# Patient Record
Sex: MALE | Race: WHITE
[De-identification: names, ages, dates, MRNs, and addresses within clinical notes are randomized per-mention and may not be internally consistent; named-entity substitution may affect disease eponyms.]

---

## 2021-07-04 ENCOUNTER — HOSPITAL ENCOUNTER (EMERGENCY)
Dept: HOSPITAL 33 - ED | Age: 71
Discharge: HOME | End: 2021-07-04
Payer: MEDICARE

## 2021-07-04 VITALS — DIASTOLIC BLOOD PRESSURE: 76 MMHG | SYSTOLIC BLOOD PRESSURE: 156 MMHG | HEART RATE: 61 BPM

## 2021-07-04 VITALS — OXYGEN SATURATION: 97 %

## 2021-07-04 DIAGNOSIS — M54.2: ICD-10-CM

## 2021-07-04 DIAGNOSIS — E03.9: ICD-10-CM

## 2021-07-04 DIAGNOSIS — S01.81XA: Primary | ICD-10-CM

## 2021-07-04 DIAGNOSIS — H57.12: ICD-10-CM

## 2021-07-04 DIAGNOSIS — R11.0: ICD-10-CM
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
## 2021-07-04 DIAGNOSIS — E78.00: ICD-10-CM

## 2021-07-04 DIAGNOSIS — S01.112A: ICD-10-CM

## 2021-07-04 DIAGNOSIS — I10: ICD-10-CM

## 2021-07-04 DIAGNOSIS — R51.9: ICD-10-CM

## 2021-07-04 PROCEDURE — 70486 CT MAXILLOFACIAL W/O DYE: CPT

## 2021-07-04 PROCEDURE — 70450 CT HEAD/BRAIN W/O DYE: CPT

## 2021-07-04 PROCEDURE — 72125 CT NECK SPINE W/O DYE: CPT

## 2021-07-04 PROCEDURE — 99283 EMERGENCY DEPT VISIT LOW MDM: CPT

## 2021-07-04 PROCEDURE — 12011 RPR F/E/E/N/L/M 2.5 CM/<: CPT

## 2021-07-04 NOTE — XRAY
Indication: Status post fall.  Left facial laceration.



Multiple contiguous axial images obtained through the facial bones.  Sagittal

and coronal reformatted images obtained.



Comparison: None



Mild left periorbital soft tissue swelling/laceration with mild subcutaneous

emphysema.  No acute fracture, suspicious bony lesions, or radiopaque foreign

body.  Orbits including roof, walls, and floors are intact.  Paranasal sinuses

and nasal passages are clear.  Mild nasal septal deviation to the right.

Remaining visualized noncontrasted soft tissues are unremarkable.



CT head and CT cervical spine reported separately.



Impression: Left periorbital soft tissue swelling/laceration.  Negative acute

fracture or radiopaque foreign body.  Incidental mild septal deviation.



Comment: Preliminary interpretation was made by [facility].  No critical discrepancy.

## 2021-07-04 NOTE — XRAY
Indication: Status post fall.  Left facial laceration.



Multiple contiguous axial images obtained through the head without contrast.



Comparison: None



Age-appropriate global atrophy and mild periventricular degenerative

micro-ischemia bilaterally.  No acute intracranial hemorrhage, abnormal

extra-axial fluid collection, or mass effect.  Fourth ventricle is midline

without hydrocephalus.  Bony calvarium intact.  Visualized paranasal sinuses

and mastoid air cells are clear.



CT facial bones and CT cervical spine reported separately.



Impression: Nonacute senile brain.



Comment: Preliminary interpretation was made by [facility].  No critical discrepancy.

## 2021-07-04 NOTE — XRAY
Indication: Status post fall.  Left facial laceration.



Multiple contiguous axial images obtained through the cervical spine.

Sagittal and coronal reformatted images obtained.



Comparison: None



Axial images are negative for acute fracture, suspicious bony lesions or

spinal canal stenosis.  There is mild/moderate multilevel degenerative

endplate spurring and mild/moderate multilevel bilateral degenerative facet

hypertrophy.  Additional atlantoaxial degenerative arthropathy.  Sagittal and

coronal reformatted images demonstrates normal alignment with mild multilevel

disc space narrowing greatest at C5-C6 level.  No acute compression fracture,

subluxation, or jumped facet.  Normal appearing craniocervical junction.

Visualized noncontrasted soft tissues demonstrates mild bilateral carotid

calcifications.  Lung apices are clear with mild pulmonary emphysema.



CT head and CT facial bones reported separately.



Impression:

1.  Negative acute fracture/subluxation.

2.  Multilevel degenerative changes, bilateral carotid calcifications, and

pulmonary emphysema.



Comment: Preliminary interpretation was made by [facility].  No critical discrepancy.

## 2021-07-04 NOTE — ERPHSYRPT
- History of Present Illness


Source: patient


Exam Limitations: no limitations


Patient Subjective Stated Complaint: pt states "I was walking into the bathroom 

and it was dark. I tripped and hit the marble surround around the tub."


Triage Nursing Assessment: pt ambulated into the er; pt is axo x4; c/o fall with

injury to left cheek; pt states 2/10 pain to left cheek; 2 laceration present to

left cheek; laceration measuring 2.5 cm x 0.5 cm; laceration to corner of eye 

measures 1 cm x 0.25 cm; pt states he had nausea; pt denies vomiting; pt denies 

LOC; pt states signification amount of bleeding was present; upon arrival 

minimal bleeding present with 3 butterfly strips present; hypertensive


Physician History: 





70 yo wm tripped in his daughter's bathroom tonight striking his L periorbital 

area. Pt denies LOC/C-spine pain/chest pain/abdominal pain/hip pain/upper-LE 

pain. Tetanus is UTD.


Occurred: just prior to arrival


Reason for Fall: tripped


Injuries/Pain Location: face


Loss of Consciousness: no loss of consciousness


Quality: aching


Severity of Pain-Max: moderate


Severity of Pain-Current: moderate


Modifying Factors: Improves With: nothing


Associated Symptoms (Fall): headache, No abdominal pain, No back pain, No 

confusion, No chest pain, No dizziness, No extremity injury, No lightheadedness,

No muscle spasms, No nausea, No neck pain, No ringing in ears, No seizures, No 

shortness of breath, No vomiting, No vision changes


Allergies/Adverse Reactions: 








amoxicillin Allergy (Verified 07/04/21 03:37)


   Hives


meperidine [From Demerol] Allergy (Verified 07/04/21 03:37)


   Irregular Heart Beat


Statins-Hmg-Coa Reductase Inhibitor Allergy (Verified 07/04/21 03:37)


   Irregular Heart Beat





Hx Tetanus, Diphtheria Vaccination/Date Given: Yes


Hx Influenza Vaccination/Date Given: Yes


Hx Pneumococcal Vaccination/Date Given: Yes





Travel Risk





- International Travel


Have you traveled outside of the country in past 3 weeks: No





- Coronavirus Screening


Are you exhibiting any of the following symptoms?: No


Close contact with a COVID-19 positive Pt in past 14-21 Days: No





- Vaccine Status


Have you recieved a Covid-19 vaccination: Yes


: Moderna





- Vaccination Dates


Date of 2cond Vaccination (if applicable): 3/5/21





- Review of Systems


Constitutional: No Symptoms


Eyes: No Symptoms


Ears, Nose, & Throat: No Symptoms


Respiratory: No Symptoms


Cardiac: No Symptoms


Abdominal/Gastrointestinal: No Symptoms


Genitourinary Symptoms: No Symptoms


Musculoskeletal: Fall, Injury, No Arthralgias, No Back Pain, No Neck Pain, No 

Deformity, No Joint Redness, No Joint Pain, No Joint Swelling, No Myalgias


Skin: No Symptoms


Neurological: No Symptoms, Headache


Psychological: No Symptoms


Endocrine: No Symptoms


Hematologic/Lymphatic: No Symptoms


Immunological/Allergic: No Symptoms





- Past Medical History


Pertinent Past Medical History: Yes


Neurological History: No Pertinent History


ENT History: Cataracts


Cardiac History: High Cholesterol


Respiratory History: No Pertinent History


Endocrine Medical History: Hypothyroidism


Musculoskeletal History: Osteoarthritis


GI Medical History: GERD


 History: No Pertinent History


Psycho-Social History: Depression


Male Reproductive Disorders: Prostate Problems





- Past Surgical History


Past Surgical History: Yes


Neuro Surgical History: No Pertinent History


Cardiac: Cardiac Catheterization


Respiratory: No Pertinent History


Gastrointestinal: Cholecystectomy


Genitourinary: No Pertinent History


Musculoskeletal: Orthopedic Surgery


Male Surgical History: No Pertinent History


Other Surgical History: left hip replacement





- Social History


Smoking Status: Former smoker


Exposure to second hand smoke: No


Drug Use: none


Patient Lives Alone: No


Significant Family History: no pertinent family hx





- Nursing Vital Signs


Nursing Vital Signs: 


                               Initial Vital Signs











Temperature  98.2 F   07/04/21 03:37


 


Pulse Rate  63   07/04/21 03:37


 


Respiratory Rate  18   07/04/21 03:37


 


Blood Pressure  188/87   07/04/21 03:37


 


O2 Sat by Pulse Oximetry  97   07/04/21 03:37








                                   Pain Scale











Pain Intensity                 2











Hypertensive





- Pb Coma Score


Best Eye Response (Pb): (4) open spontaneously


Best Verbal Response (Pb): (5) oriented


Best Motor Response ([place]): (6) obeys commands


Pb Total: 15





- Physical Exam


General Appearance: no apparent distress


Head Injury: ecchymosis (L periorbital area), lacerations, swelling (L 

periorbital area)


Eye Exam: PERRL/EOMI, eyes nml inspection


ENT Exam: airway nml, No evidence of ENT injury, No clear fluid (ears), No clear

fluid (nose)


Neck Exam: supple, trachea midline (C-spine nttp)


Respiratory/Chest Exam: normal breath sounds, No chest tenderness, No decreased 

breath sounds


Cardiovascular Exam: normal heart sounds, regular rate/rhythm, normal peripheral

pulses, No murmur, No edema


Gastrointestinal Exam: soft, normal bowel sounds, No tenderness


Back Exam: normal inspection (No T or L-spine TTP)


Extremity Exam: normal inspection, normal range of motion, capillary refill <3 

sec, pelvis stable


Neurologic Exam: alert, oriented x 3, cooperative, normal mood/affect, sensation

nml


Skin Exam: normal color, warm, dry


**SpO2 Interpretation**: normal


SpO2: 97


O2 Delivery: Room Air





Procedures





- Laceration/Wound Repair


  ** Left Face


Wound Location: Left (L periorbital)


Wound Length (cm): 2.5


Wound's Depth, Shape: linear


Wound Explored: clean


Irrigated: No


Hibiclens Prep: No


Anesthesia: local, 1% lidocaine w/ Epi


Volume Anesthetic (ccs): 2.5


Wound Repaired With: sutures


Suture Size/Type: 5-0


Number of Sutures: 8


Layer Closure?: No





- Course


Nursing assessment & vital signs reviewed: Yes





- CT Exams


  ** Head


CT Interpretation: Tele-radiologist Report (No fx)





  ** Maxillofacial Bones


CT Interpretation: Tele-radiologist Report (No fx)





  ** Cervical Spine


CT Interpretation: Tele-radiologist Report (No fx)


Ordered Tests: 


                               Active Orders 24 hr











 Category Date Time Status


 


 CERVICAL SPINE WO CONTRAST [CT] Stat Exams  07/04/21 04:15 Taken


 


 FACIAL BONES WO CONTRAST [CT] Stat Exams  07/04/21 03:53 Taken


 


 HEAD WITHOUT CONTRAST [CT] Stat Exams  07/04/21 03:53 Taken














- Progress


Progress Note: 





07/04/21 04:51


L periorbital laceration x2


Anesthesia 1%lido w epi


Prepped w betadine


2.5cm laceration


5.0 Ethilon x8


1cm laceration


5.0 Ethilon x2


No comps


07/04/21 05:04


Pt later complained of cervical pain while in XR so CT C-spine done





- Departure


Departure Disposition: Home


Clinical Impression: 


 Facial laceration





Condition: Stable


Critical Care Time: No


Referrals: 


DOCTOR,NO FAMILY [Primary Care Provider] - 


Instructions:  Laceration Repair With Stitches (DC)


Additional Instructions: 


Keep lacerations dry for 2 days, then wash gently w mild soap/water 1-2 times a 

day


Sutures out in 7 days


Watch for signs of infection-redness/pain/pus/temperature greater than 100.5


Prescriptions: 


Hydrocodone/Acetaminophen [Hydrocodone-Acetamin 5-325 mg***] 1 each PO Q4HPRN 

PRN #7 tablet MDD 4 tabs


 PRN Reason: Pain